# Patient Record
Sex: MALE | Race: WHITE | NOT HISPANIC OR LATINO | Employment: FULL TIME | URBAN - METROPOLITAN AREA
[De-identification: names, ages, dates, MRNs, and addresses within clinical notes are randomized per-mention and may not be internally consistent; named-entity substitution may affect disease eponyms.]

---

## 2019-03-21 ENCOUNTER — CLINICAL SUPPORT (OUTPATIENT)
Dept: INTERNAL MEDICINE | Facility: CLINIC | Age: 54
End: 2019-03-21
Payer: COMMERCIAL

## 2019-03-21 ENCOUNTER — OFFICE VISIT (OUTPATIENT)
Dept: INTERNAL MEDICINE | Facility: CLINIC | Age: 54
End: 2019-03-21
Payer: COMMERCIAL

## 2019-03-21 VITALS
WEIGHT: 260.13 LBS | SYSTOLIC BLOOD PRESSURE: 124 MMHG | HEART RATE: 93 BPM | HEIGHT: 73 IN | BODY MASS INDEX: 34.47 KG/M2 | DIASTOLIC BLOOD PRESSURE: 74 MMHG | OXYGEN SATURATION: 97 %

## 2019-03-21 DIAGNOSIS — Z00.00 ANNUAL PHYSICAL EXAM: Primary | ICD-10-CM

## 2019-03-21 DIAGNOSIS — Z00.00 ENCOUNTER FOR MEDICAL EXAMINATION TO ESTABLISH CARE: ICD-10-CM

## 2019-03-21 DIAGNOSIS — Z79.899 CURRENT USE OF PROTON PUMP INHIBITOR: ICD-10-CM

## 2019-03-21 DIAGNOSIS — K20.0 EOSINOPHILIC ESOPHAGITIS: ICD-10-CM

## 2019-03-21 PROCEDURE — 90471 IMMUNIZATION ADMIN: CPT | Mod: S$GLB,,, | Performed by: INTERNAL MEDICINE

## 2019-03-21 PROCEDURE — 90471 TDAP VACCINE GREATER THAN OR EQUAL TO 7YO IM: ICD-10-PCS | Mod: S$GLB,,, | Performed by: INTERNAL MEDICINE

## 2019-03-21 PROCEDURE — 90715 TDAP VACCINE GREATER THAN OR EQUAL TO 7YO IM: ICD-10-PCS | Mod: S$GLB,,, | Performed by: INTERNAL MEDICINE

## 2019-03-21 PROCEDURE — 99999 PR PBB SHADOW E&M-EST. PATIENT-LVL III: ICD-10-PCS | Mod: PBBFAC,,, | Performed by: INTERNAL MEDICINE

## 2019-03-21 PROCEDURE — 99999 PR PBB SHADOW E&M-EST. PATIENT-LVL I: CPT | Mod: PBBFAC,,,

## 2019-03-21 PROCEDURE — 99999 PR PBB SHADOW E&M-EST. PATIENT-LVL I: ICD-10-PCS | Mod: PBBFAC,,,

## 2019-03-21 PROCEDURE — 3008F PR BODY MASS INDEX (BMI) DOCUMENTED: ICD-10-PCS | Mod: CPTII,S$GLB,, | Performed by: INTERNAL MEDICINE

## 2019-03-21 PROCEDURE — 99204 PR OFFICE/OUTPT VISIT, NEW, LEVL IV, 45-59 MIN: ICD-10-PCS | Mod: S$GLB,,, | Performed by: INTERNAL MEDICINE

## 2019-03-21 PROCEDURE — 99204 OFFICE O/P NEW MOD 45 MIN: CPT | Mod: S$GLB,,, | Performed by: INTERNAL MEDICINE

## 2019-03-21 PROCEDURE — 99999 PR PBB SHADOW E&M-EST. PATIENT-LVL III: CPT | Mod: PBBFAC,,, | Performed by: INTERNAL MEDICINE

## 2019-03-21 PROCEDURE — 3008F BODY MASS INDEX DOCD: CPT | Mod: CPTII,S$GLB,, | Performed by: INTERNAL MEDICINE

## 2019-03-21 PROCEDURE — 90715 TDAP VACCINE 7 YRS/> IM: CPT | Mod: S$GLB,,, | Performed by: INTERNAL MEDICINE

## 2019-03-21 RX ORDER — OMEPRAZOLE 20 MG/1
20 TABLET, ORALLY DISINTEGRATING, DELAYED RELEASE ORAL DAILY
COMMUNITY
Start: 2018-04-01

## 2019-03-21 NOTE — PROGRESS NOTES
INTERNAL MEDICINE CLINIC    Initial Visit to Establish Care    PRESENTING HISTORY     Previous PCP: Damon Castañeda MD (Inactive)  Chief Complaint/Reason for Visit:     Chief Complaint   Patient presents with    Annual Exam       History of Present Illness & ROS : Mr. Sharif Ham is a 53 y.o. male.      Answers for HPI/ROS submitted by the patient on 3/18/2019   activity change: No  unexpected weight change: No  neck pain: No  hearing loss: No  rhinorrhea: No  trouble swallowing: No  eye discharge: No  visual disturbance: No  chest tightness: No  wheezing: No  chest pain: No  palpitations: No  blood in stool: No  constipation: No  vomiting: No  diarrhea: No  polydipsia: No  polyuria: No  difficulty urinating: No  urgency: No  hematuria: No  joint swelling: No  arthralgias: No  headaches: No  weakness: No  confusion: No  dysphoric mood: No      PAST HISTORY:     Past Medical History:   Diagnosis Date    Basal cell carcinoma excised 12/16/2014    right arm     Cholelithiasis 11/24/2013    S/p cholecystectomy    Current use of proton pump inhibitor 3/21/2019    Eosinophilic esophagitis 9/10/2015    8-7-2015 1. ESOPHAGEAL BIOPSY SHOWING FRAGMENTS OF BENIGN SQUAMOUS MUCOSA WITH NO ACTIVE INFLAMMATION, EVIDENCE OF EOSINOPHILIC ESOPHAGITIS OR DYSPLASIA IDENTIFIED. 2. ESOPHAGEAL BIOPSY SHOWING FRAGMENTS OF BENIGN SQUAMOUS MUCOSA WITH APPROXIMATELY 20 EOSINOPHILS PER HIGH-POWER FIELD. NO ACUTE INFLAMMATION OR DYSPLASIA IDENTIFIED.    Gallstone pancreatitis 11/24/2013    had cholecystectomy    Seasonal allergic rhinitis due to pollen 9/10/2015       Past Surgical History:   Procedure Laterality Date    CARPAL TUNNEL RELEASE Right 06/2017    CHOLECYSTECTOMY      CHOLECYSTECTOMY, LAPAROSCOPIC N/A 11/26/2013    [facility]    COLONOSCOPY  07/18/2018    [facility] ([place]): normal    2013 had polyp    ESOPHAGOGASTRODUODENOSCOPY  03/2018    Still with eosinophil  esophagitis    ESOPHAGOGASTRODUODENOSCOPY (EGD) N/A 2015    Per[facility]mer [facility]jesus [facility] at [facility] ENDO (4TH FLR)    ESOPHAGOGASTRODUODENOSCOPY (EGD) N/A 2015    [facility]O    LAMINECTOMY AND MICRODISCECTOMY SPINE  2016    L4-L5    VASECTOMY         Family History   Problem Relation Age of Onset    Uterine cancer Mother     Insomnia Father         Fatal familiar insomnia    Diabetes Sister     Swallowing difficulties Sister     Prostate cancer Maternal Grandfather     Alzheimer's disease Maternal Grandmother     Breast cancer Sister     Melanoma Neg Hx     Psoriasis Neg Hx     Lupus Neg Hx     Eczema Neg Hx     Celiac disease Neg Hx     Cirrhosis Neg Hx     Colon cancer Neg Hx     Crohn's disease Neg Hx     Esophageal cancer Neg Hx     Irritable bowel syndrome Neg Hx     Liver cancer Neg Hx     Rectal cancer Neg Hx     Ulcerative colitis Neg Hx        Social History     Socioeconomic History    Marital status:      Spouse name: Mariola    Number of children: 0    Years of education: None    Highest education level: None   Social Needs    Financial resource strain: None    Food insecurity - worry: None    Food insecurity - inability: None    Transportation needs - medical: None    Transportation needs - non-medical: None   Occupational History    Occupation: [employer]   Tobacco Use    Smoking status: Former Smoker     Years: 10.00     Last attempt to quit: 3/21/1994     Years since quittin.0    Tobacco comment: quit 23 yrs ago   Substance and Sexual Activity    Alcohol use: Yes     Comment: socially -1 drink per night    Drug use: No    Sexual activity: Yes     Partners: Female   Other Topics Concern    None   Social History Narrative     to: Mariola    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       MEDICATIONS & ALLERGIES:     Current Outpatient Medications on File Prior to Visit    Medication Sig Dispense Refill    omeprazole 20 mg TbLD OTC Take 20 mg by mouth once daily.                            Review of patient's allergies indicates:   Allergen Reactions    Naproxen Diarrhea and Other (See Comments)     GAS, /severe Stomach irritation.         Medications Reconciliation:   I have reconciled the patient's home medications with the patient/family. I have updated all changes.  Refer to After-Visit Medication List.    OBJECTIVE:     Vital Signs:  Vitals:    03/21/19 1215   BP: 124/74   Pulse: 93     Wt Readings from Last 1 Encounters:   03/21/19 1215 118 kg (260 lb 2.3 oz)     Body mass index is 34.32 kg/m².     Physical Exam:  General: Well developed, well nourished. No distress.  HEENT: Head is normocephalic, atraumatic; ears are normal.    Eyes: Clear conjunctiva.  Neck: Supple, symmetrical neck; trachea midline.  Lungs: Clear to auscultation bilaterally and normal respiratory effort.  Cardiovascular: Heart with regular rate and rhythm.    Extremities: No LE edema.    Abdomen: Abdomen is soft, non-tender non-distended with normal bowel sounds.  Musculoskeletal: Normal gait.   Genital:  Normal penis.  No rash.  Scrotum and epididymis normal. No inguinal hernia.  No inguinal nodes. No rash found.  Rectal: slightly enlarged, smooth.  Lymph Nodes: No cervical, supraclavicular or axillary adenopathy.  Psychiatric: Normal affect. Alert.    Laboratory  Lab Results   Component Value Date    WBC 7.19 08/31/2015    HGB 13.4 (L) 08/31/2015    HCT 41.5 08/31/2015     08/31/2015    CHOL 172 11/25/2013    TRIG 94 11/25/2013    HDL 47 11/25/2013    ALT 28 08/31/2015    AST 19 08/31/2015     08/31/2015    K 4.2 08/31/2015     08/31/2015    CREATININE 0.9 08/31/2015    BUN 12 08/31/2015    CO2 26 08/31/2015    TSH 0.802 12/27/2010    PSA 0.68 12/27/2010    HGBA1C 5.9 04/17/2008       ASSESSMENT & PLAN:   New patient who has not seen Primary Care Provider at [facility] for the past 3  years.  Patient is new to me. Medical, surgical, social, medication and allergy histories were obtained during this visit.    Annual Physical  Encounter for medical examination to establish care  - Reviewed and updated past and current medical problems.  Discussed treatment of current medical problems    -     Lipid panel; Future; Expected date: 03/21/2019  -     CBC auto differential; Future; Expected date: 03/21/2019  -     Comprehensive metabolic panel; Future; Expected date: 03/21/2019  -     TSH; Future; Expected date: 03/21/2019  -     Hemoglobin A1c; Future; Expected date: 03/21/2019  -     PSA, Screening; Future; Expected date: 03/21/2019  -     Vitamin D; Future; Expected date: 09/17/2019  -     Vitamin B12; Future; Expected date: 03/21/2019  -     Hepatitis C antibody; Future; Expected date: 03/21/2019    Eosinophilic esophagitis  - On Omeprazole 20 mg daily OTC.    [place] [facility] [place] [facility]    Current use of proton pump inhibitor  -     Vitamin D; Future; Expected date: 09/17/2019  -     Vitamin B12; Future; Expected date: 03/21/2019    Preventive Health Maintenance:  Tdap today.  Labs.    Return to Clinic for Follow Up with me:   1 year      Scheduled Follow-up :  Future Appointments   Date Time Provider Department Center   3/28/2019  8:00 AM LAB, APPOINTMENT [facility]       After Visit Medication List :     Medication List           Accurate as of 3/21/19 12:54 PM. If you have any questions, ask your nurse or doctor.               CONTINUE taking these medications    omeprazole 20 mg Tbld        STOP taking these medications    econazole nitrate 1 % cream  Stopped by:  Sharif Rosa MD     fluconazole 200 MG Tab  Commonly known as:  DIFLUCAN  Stopped by:  Sharif Rosa MD     fluticasone 50 mcg/actuation nasal spray  Commonly known as:  FLONASE  Stopped by:  Sharif Rosa MD     iodoquinol-HC 1-1 % Crea  Stopped by:  Sharif Rosa MD      ketoconazole 2 % cream  Commonly known as:  NIZORAL  Stopped by:  Sharif Rosa MD     pantoprazole 40 MG tablet  Commonly known as:  PROTONIX  Stopped by:  Sharif Rosa MD     VYTONE 1.9-1 % Crpk  Generic drug:  hydrocortisone-iodoquinol-aloe  Stopped by:  Sharif Rosa MD            Signing Physician:  Sharif Rosa MD

## 2019-03-28 ENCOUNTER — LAB VISIT (OUTPATIENT)
Dept: LAB | Facility: HOSPITAL | Age: 54
End: 2019-03-28
Attending: INTERNAL MEDICINE
Payer: COMMERCIAL

## 2019-03-28 ENCOUNTER — TELEPHONE (OUTPATIENT)
Dept: INTERNAL MEDICINE | Facility: CLINIC | Age: 54
End: 2019-03-28

## 2019-03-28 DIAGNOSIS — Z00.00 ENCOUNTER FOR MEDICAL EXAMINATION TO ESTABLISH CARE: ICD-10-CM

## 2019-03-28 DIAGNOSIS — R97.20 ELEVATED PSA: ICD-10-CM

## 2019-03-28 DIAGNOSIS — R73.03 PREDIABETES: ICD-10-CM

## 2019-03-28 DIAGNOSIS — Z79.899 CURRENT USE OF PROTON PUMP INHIBITOR: ICD-10-CM

## 2019-03-28 DIAGNOSIS — E55.9 VITAMIN D INSUFFICIENCY: Primary | ICD-10-CM

## 2019-03-28 LAB
25(OH)D3+25(OH)D2 SERPL-MCNC: 17 NG/ML (ref 30–96)
ALBUMIN SERPL BCP-MCNC: 4.2 G/DL (ref 3.5–5.2)
ALP SERPL-CCNC: 77 U/L (ref 55–135)
ALT SERPL W/O P-5'-P-CCNC: 32 U/L (ref 10–44)
ANION GAP SERPL CALC-SCNC: 10 MMOL/L (ref 8–16)
AST SERPL-CCNC: 22 U/L (ref 10–40)
BASOPHILS # BLD AUTO: 0 K/UL (ref 0–0.2)
BASOPHILS NFR BLD: 0 % (ref 0–1.9)
BILIRUB SERPL-MCNC: 0.5 MG/DL (ref 0.1–1)
BUN SERPL-MCNC: 11 MG/DL (ref 6–20)
CALCIUM SERPL-MCNC: 9.8 MG/DL (ref 8.7–10.5)
CHLORIDE SERPL-SCNC: 103 MMOL/L (ref 95–110)
CHOLEST SERPL-MCNC: 185 MG/DL (ref 120–199)
CHOLEST/HDLC SERPL: 3.8 {RATIO} (ref 2–5)
CO2 SERPL-SCNC: 25 MMOL/L (ref 23–29)
COMPLEXED PSA SERPL-MCNC: 4.2 NG/ML (ref 0–4)
CREAT SERPL-MCNC: 0.9 MG/DL (ref 0.5–1.4)
DIFFERENTIAL METHOD: ABNORMAL
EOSINOPHIL # BLD AUTO: 0 K/UL (ref 0–0.5)
EOSINOPHIL NFR BLD: 0 % (ref 0–8)
ERYTHROCYTE [DISTWIDTH] IN BLOOD BY AUTOMATED COUNT: 12.9 % (ref 11.5–14.5)
EST. GFR  (AFRICAN AMERICAN): >60 ML/MIN/1.73 M^2
EST. GFR  (NON AFRICAN AMERICAN): >60 ML/MIN/1.73 M^2
ESTIMATED AVG GLUCOSE: 134 MG/DL (ref 68–131)
GLUCOSE SERPL-MCNC: 139 MG/DL (ref 70–110)
HBA1C MFR BLD HPLC: 6.3 % (ref 4–5.6)
HCT VFR BLD AUTO: 44.7 % (ref 40–54)
HCV AB SERPL QL IA: NEGATIVE
HDLC SERPL-MCNC: 49 MG/DL (ref 40–75)
HDLC SERPL: 26.5 % (ref 20–50)
HGB BLD-MCNC: 14.4 G/DL (ref 14–18)
LDLC SERPL CALC-MCNC: 125.4 MG/DL (ref 63–159)
LYMPHOCYTES # BLD AUTO: 0.6 K/UL (ref 1–4.8)
LYMPHOCYTES NFR BLD: 9.6 % (ref 18–48)
MCH RBC QN AUTO: 28.4 PG (ref 27–31)
MCHC RBC AUTO-ENTMCNC: 32.2 G/DL (ref 32–36)
MCV RBC AUTO: 88 FL (ref 82–98)
MONOCYTES # BLD AUTO: 0.5 K/UL (ref 0.3–1)
MONOCYTES NFR BLD: 7.4 % (ref 4–15)
NEUTROPHILS # BLD AUTO: 5.1 K/UL (ref 1.8–7.7)
NEUTROPHILS NFR BLD: 82.8 % (ref 38–73)
NONHDLC SERPL-MCNC: 136 MG/DL
PLATELET # BLD AUTO: 197 K/UL (ref 150–350)
PMV BLD AUTO: 11.2 FL (ref 9.2–12.9)
POTASSIUM SERPL-SCNC: 4 MMOL/L (ref 3.5–5.1)
PROT SERPL-MCNC: 8.1 G/DL (ref 6–8.4)
RBC # BLD AUTO: 5.07 M/UL (ref 4.6–6.2)
SODIUM SERPL-SCNC: 138 MMOL/L (ref 136–145)
TRIGL SERPL-MCNC: 53 MG/DL (ref 30–150)
TSH SERPL DL<=0.005 MIU/L-ACNC: 0.49 UIU/ML (ref 0.4–4)
VIT B12 SERPL-MCNC: 744 PG/ML (ref 210–950)
WBC # BLD AUTO: 6.12 K/UL (ref 3.9–12.7)

## 2019-03-28 PROCEDURE — 82306 VITAMIN D 25 HYDROXY: CPT

## 2019-03-28 PROCEDURE — 86803 HEPATITIS C AB TEST: CPT

## 2019-03-28 PROCEDURE — 84443 ASSAY THYROID STIM HORMONE: CPT

## 2019-03-28 PROCEDURE — 83036 HEMOGLOBIN GLYCOSYLATED A1C: CPT

## 2019-03-28 PROCEDURE — 82607 VITAMIN B-12: CPT

## 2019-03-28 PROCEDURE — 36415 COLL VENOUS BLD VENIPUNCTURE: CPT

## 2019-03-28 PROCEDURE — 85025 COMPLETE CBC W/AUTO DIFF WBC: CPT

## 2019-03-28 PROCEDURE — 80061 LIPID PANEL: CPT

## 2019-03-28 PROCEDURE — 80053 COMPREHEN METABOLIC PANEL: CPT

## 2019-03-28 PROCEDURE — 84153 ASSAY OF PSA TOTAL: CPT

## 2019-03-28 RX ORDER — ACETAMINOPHEN 500 MG
1 TABLET ORAL DAILY
Qty: 90 CAPSULE | Refills: 3 | Status: SHIPPED | OUTPATIENT
Start: 2019-03-28

## 2019-03-28 NOTE — TELEPHONE ENCOUNTER
Spoke to the patient.  A1c is now in Prediabetes range.  Discussed exercise and weight loss.    PSA is abnormal 4.2.  Exam showed smooth prostate during visit.  Option: recheck in 6 month vs referral to Urology.  He preferred recheck in 6  Month.    Vitamin D insufficiency: Rx Vitamin D 2000 IU daily.    Repeat A1c, PSA, Vitamin D in 6 months.    Patient to schedule appt with me for visit in 6 months.

## 2020-10-05 ENCOUNTER — PATIENT MESSAGE (OUTPATIENT)
Dept: ADMINISTRATIVE | Facility: HOSPITAL | Age: 55
End: 2020-10-05

## 2021-01-04 ENCOUNTER — PATIENT MESSAGE (OUTPATIENT)
Dept: ADMINISTRATIVE | Facility: HOSPITAL | Age: 56
End: 2021-01-04

## 2021-04-05 ENCOUNTER — PATIENT MESSAGE (OUTPATIENT)
Dept: ADMINISTRATIVE | Facility: HOSPITAL | Age: 56
End: 2021-04-05

## 2021-04-16 ENCOUNTER — PATIENT MESSAGE (OUTPATIENT)
Dept: RESEARCH | Facility: HOSPITAL | Age: 56
End: 2021-04-16

## 2021-07-07 ENCOUNTER — PATIENT MESSAGE (OUTPATIENT)
Dept: ADMINISTRATIVE | Facility: HOSPITAL | Age: 56
End: 2021-07-07

## 2024-02-24 ENCOUNTER — CLINICAL SUPPORT (OUTPATIENT)
Dept: OTHER | Facility: CLINIC | Age: 59
End: 2024-02-24

## 2024-02-24 DIAGNOSIS — Z00.8 ENCOUNTER FOR OTHER GENERAL EXAMINATION: ICD-10-CM

## 2024-02-29 VITALS
SYSTOLIC BLOOD PRESSURE: 154 MMHG | DIASTOLIC BLOOD PRESSURE: 86 MMHG | WEIGHT: 243 LBS | HEIGHT: 71 IN | BODY MASS INDEX: 34.02 KG/M2

## 2024-02-29 LAB
HDLC SERPL-MCNC: 59 MG/DL
POC CHOLESTEROL, LDL (DOCK): 114 MG/DL
POC CHOLESTEROL, TOTAL: 184 MG/DL
POC GLUCOSE, FASTING: 120 MG/DL (ref 60–110)
TRIGL SERPL-MCNC: 57 MG/DL